# Patient Record
Sex: FEMALE | Race: WHITE | NOT HISPANIC OR LATINO | URBAN - METROPOLITAN AREA
[De-identification: names, ages, dates, MRNs, and addresses within clinical notes are randomized per-mention and may not be internally consistent; named-entity substitution may affect disease eponyms.]

---

## 2019-04-12 ENCOUNTER — EMERGENCY (EMERGENCY)
Facility: HOSPITAL | Age: 84
LOS: 1 days | Discharge: HOME | End: 2019-04-12
Attending: EMERGENCY MEDICINE
Payer: MEDICARE

## 2019-04-12 VITALS
WEIGHT: 145.06 LBS | HEIGHT: 64 IN | RESPIRATION RATE: 20 BRPM | OXYGEN SATURATION: 95 % | TEMPERATURE: 98 F | SYSTOLIC BLOOD PRESSURE: 141 MMHG | HEART RATE: 67 BPM | DIASTOLIC BLOOD PRESSURE: 66 MMHG

## 2019-04-12 VITALS
SYSTOLIC BLOOD PRESSURE: 140 MMHG | TEMPERATURE: 98 F | DIASTOLIC BLOOD PRESSURE: 72 MMHG | OXYGEN SATURATION: 95 % | HEART RATE: 70 BPM | RESPIRATION RATE: 20 BRPM

## 2019-04-12 LAB
ALBUMIN SERPL ELPH-MCNC: 4.2 G/DL — SIGNIFICANT CHANGE UP (ref 3.3–5.2)
ALP SERPL-CCNC: 102 U/L — SIGNIFICANT CHANGE UP (ref 40–120)
ALT FLD-CCNC: 21 U/L — SIGNIFICANT CHANGE UP
ANION GAP SERPL CALC-SCNC: 13 MMOL/L — SIGNIFICANT CHANGE UP (ref 5–17)
AST SERPL-CCNC: 19 U/L — SIGNIFICANT CHANGE UP
BASOPHILS # BLD AUTO: 0 K/UL — SIGNIFICANT CHANGE UP (ref 0–0.2)
BASOPHILS NFR BLD AUTO: 0.2 % — SIGNIFICANT CHANGE UP (ref 0–2)
BILIRUB SERPL-MCNC: 0.3 MG/DL — LOW (ref 0.4–2)
BUN SERPL-MCNC: 18 MG/DL — SIGNIFICANT CHANGE UP (ref 8–20)
CALCIUM SERPL-MCNC: 9.7 MG/DL — SIGNIFICANT CHANGE UP (ref 8.6–10.2)
CHLORIDE SERPL-SCNC: 103 MMOL/L — SIGNIFICANT CHANGE UP (ref 98–107)
CO2 SERPL-SCNC: 27 MMOL/L — SIGNIFICANT CHANGE UP (ref 22–29)
CREAT SERPL-MCNC: 0.99 MG/DL — SIGNIFICANT CHANGE UP (ref 0.5–1.3)
EOSINOPHIL # BLD AUTO: 0.2 K/UL — SIGNIFICANT CHANGE UP (ref 0–0.5)
EOSINOPHIL NFR BLD AUTO: 2.5 % — SIGNIFICANT CHANGE UP (ref 0–6)
GLUCOSE SERPL-MCNC: 145 MG/DL — HIGH (ref 70–115)
HCT VFR BLD CALC: 39.4 % — SIGNIFICANT CHANGE UP (ref 37–47)
HGB BLD-MCNC: 12.8 G/DL — SIGNIFICANT CHANGE UP (ref 12–16)
LYMPHOCYTES # BLD AUTO: 1.6 K/UL — SIGNIFICANT CHANGE UP (ref 1–4.8)
LYMPHOCYTES # BLD AUTO: 18.5 % — LOW (ref 20–55)
MCHC RBC-ENTMCNC: 26.9 PG — LOW (ref 27–31)
MCHC RBC-ENTMCNC: 32.5 G/DL — SIGNIFICANT CHANGE UP (ref 32–36)
MCV RBC AUTO: 82.8 FL — SIGNIFICANT CHANGE UP (ref 81–99)
MONOCYTES # BLD AUTO: 1 K/UL — HIGH (ref 0–0.8)
MONOCYTES NFR BLD AUTO: 10.8 % — HIGH (ref 3–10)
NEUTROPHILS # BLD AUTO: 5.9 K/UL — SIGNIFICANT CHANGE UP (ref 1.8–8)
NEUTROPHILS NFR BLD AUTO: 67.7 % — SIGNIFICANT CHANGE UP (ref 37–73)
PLATELET # BLD AUTO: 204 K/UL — SIGNIFICANT CHANGE UP (ref 150–400)
POTASSIUM SERPL-MCNC: 4.5 MMOL/L — SIGNIFICANT CHANGE UP (ref 3.5–5.3)
POTASSIUM SERPL-SCNC: 4.5 MMOL/L — SIGNIFICANT CHANGE UP (ref 3.5–5.3)
PROT SERPL-MCNC: 6.8 G/DL — SIGNIFICANT CHANGE UP (ref 6.6–8.7)
RBC # BLD: 4.76 M/UL — SIGNIFICANT CHANGE UP (ref 4.4–5.2)
RBC # FLD: 14.3 % — SIGNIFICANT CHANGE UP (ref 11–15.6)
SODIUM SERPL-SCNC: 143 MMOL/L — SIGNIFICANT CHANGE UP (ref 135–145)
TROPONIN T SERPL-MCNC: <0.01 NG/ML — SIGNIFICANT CHANGE UP (ref 0–0.06)
WBC # BLD: 8.8 K/UL — SIGNIFICANT CHANGE UP (ref 4.8–10.8)
WBC # FLD AUTO: 8.8 K/UL — SIGNIFICANT CHANGE UP (ref 4.8–10.8)

## 2019-04-12 PROCEDURE — 71045 X-RAY EXAM CHEST 1 VIEW: CPT | Mod: 26

## 2019-04-12 PROCEDURE — 73552 X-RAY EXAM OF FEMUR 2/>: CPT | Mod: 26,LT

## 2019-04-12 PROCEDURE — 99284 EMERGENCY DEPT VISIT MOD MDM: CPT | Mod: 25

## 2019-04-12 PROCEDURE — 70450 CT HEAD/BRAIN W/O DYE: CPT | Mod: 26

## 2019-04-12 PROCEDURE — 84484 ASSAY OF TROPONIN QUANT: CPT

## 2019-04-12 PROCEDURE — 73562 X-RAY EXAM OF KNEE 3: CPT

## 2019-04-12 PROCEDURE — 99285 EMERGENCY DEPT VISIT HI MDM: CPT

## 2019-04-12 PROCEDURE — 36415 COLL VENOUS BLD VENIPUNCTURE: CPT

## 2019-04-12 PROCEDURE — 80053 COMPREHEN METABOLIC PANEL: CPT

## 2019-04-12 PROCEDURE — 73552 X-RAY EXAM OF FEMUR 2/>: CPT

## 2019-04-12 PROCEDURE — 93005 ELECTROCARDIOGRAM TRACING: CPT

## 2019-04-12 PROCEDURE — 73562 X-RAY EXAM OF KNEE 3: CPT | Mod: 26,50

## 2019-04-12 PROCEDURE — 73502 X-RAY EXAM HIP UNI 2-3 VIEWS: CPT | Mod: 26,LT

## 2019-04-12 PROCEDURE — 73502 X-RAY EXAM HIP UNI 2-3 VIEWS: CPT

## 2019-04-12 PROCEDURE — 85027 COMPLETE CBC AUTOMATED: CPT

## 2019-04-12 PROCEDURE — 93010 ELECTROCARDIOGRAM REPORT: CPT

## 2019-04-12 PROCEDURE — 70450 CT HEAD/BRAIN W/O DYE: CPT

## 2019-04-12 PROCEDURE — 71045 X-RAY EXAM CHEST 1 VIEW: CPT

## 2019-04-12 NOTE — PHYSICAL THERAPY INITIAL EVALUATION ADULT - LEVEL OF INDEPENDENCE: SIT/STAND, REHAB EVAL
Admission medication history interview status for the 5/15/2018 admission is complete. See Epic admission navigator for allergy information, pharmacy, prior to admission medications and immunization status.     Medication history interview sources:   [facility]    Changes made to PTA medication list (reason)  Added: none  Deleted: Multivitamin  Changed:   Levetiracetam from daily to BID - note that there is a stop date of 5/21 on this medication on the MAR. Subsequent plan not noted.     Additional medication history information (including reliability of information, actions taken by pharmacist):   - Patient was refusing Prostat  - Fluconazole was scheduled 5/9-5/15 for fungal dermatitis, though patient only took it until 5/13  - Patient not taking modular protein supplement        Prior to Admission medications    Medication Sig Last Dose Taking? Auth Provider   diazepam (VALIUM) 2 MG tablet Take 2 tablets (4 mg) by mouth every 6 hours as needed for anxiety 5/15/2018 at 1500 Yes Shahriar Cadet MD   ipratropium - albuterol 0.5 mg/2.5 mg/3 mL (DUONEB) 0.5-2.5 (3) MG/3ML neb solution Take 1 vial (3 mLs) by nebulization every 4 hours as needed for wheezing Past Month at Unknown time Yes Shahriar Cadet MD   levETIRAcetam (KEPPRA) 250 MG tablet Take 1 tablet (250 mg) by mouth every evening Until 4/7/2018 then decrease to 250mg BID 4/8/2018-4/21/2018, then decrease to 250mg QAM 4/22-5/5/2018  Patient taking differently: Take 250 mg by mouth 2 times daily  5/15/2018 at 0800 Yes Shahriar Cadet MD   LEVOTHYROXINE SODIUM PO Take 125 mcg by mouth every morning 5/15/2018 at 0600 Yes Unknown, Entered By History   loperamide (IMODIUM) 2 MG capsule Take 2 capsules (4 mg) by mouth 4 times daily 5/15/2018 at 1600 Yes Shahriar Cadet MD   miconazole (MICATIN) 2 % AERP powder Apply topically 2 times daily Apply to groin folds 5/15/2018 at 0800 Yes Unknown, Entered By History   mineral  oil-hydrophilic petrolatum (AQUAPHOR) Apply topically daily Apply to lower extremities for skin irritation. 5/15/2018 at 0800 Yes Unknown, Entered By History   OMEPRAZOLE PO Take 20 mg by mouth daily (with breakfast) 5/15/2018 at 0800 Yes Unknown, Entered By History   ondansetron (ZOFRAN ODT) 4 MG ODT tab Take 1 tablet (4 mg) by mouth every 6 hours as needed for nausea 5/10/2018 at 2230 Yes Shahriar Cadet MD   oxyCODONE IR (ROXICODONE) 5 MG tablet Take 1-2 tablets (5-10 mg) by mouth every 4 hours as needed (Give 5mg for pain level 4-6 on a 10 point scale. Give 10mg for pain level 6-10 on a 10 point scale.) 5/15/2018 at 1800 Yes Shahriar Cadet MD   Rivaroxaban (XARELTO PO) Take 20 mg by mouth At Bedtime 5/14/2018 at 2000 Yes Unknown, Entered By History   triamcinolone (KENALOG) 0.1 % cream Apply topically 3 times daily 5/15/2018 at 0800 Yes Shahriar Cadet MD   protein modular (PROSOURCE TF) 1 packet by Per Feeding Tube route 3 times daily Unknown at Unknown time  Shahriar Cadet MD         Medication history completed by: Laura Rossi, PharmD  May 17, 2018         Modified Independent

## 2019-04-12 NOTE — PROVIDER CONTACT NOTE (OTHER) - ASSESSMENT
Pt with c/o 4/ 10 bilat knee pain, before, during and after Rx. Pt is functionally independent and not in need of PT.  Will no longer continue to follow. Pt left supine in bed in no apparent distress and call bell within reach, RN made aware.

## 2019-04-12 NOTE — ED PROVIDER NOTE - PROGRESS NOTE DETAILS
pt has no complaints at this time no fx on xray walked with walker with pt--will dc; unable to give ua but denies any symptoms

## 2019-04-12 NOTE — PHYSICAL THERAPY INITIAL EVALUATION ADULT - ADDITIONAL COMMENTS
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

## 2019-04-12 NOTE — CHART NOTE - NSCHARTNOTEFT_GEN_A_CORE
SHERICE Note - notified that pt already possesses RW - SHERICE called pt residence [facility] and spoke with RN Nanda who confirmed that pt does have RW in her room. pt being transported by BLS and will have her RW upon arrival. SHERICE spoke with pt who acknowledged that she "thought I might have one and don't need that one" RW was placed back in the CM/SW office - security Ovi was able to access and SW placed inside. pt comfortable and eager to return home.

## 2019-04-12 NOTE — CHART NOTE - NSCHARTNOTEFT_GEN_A_CORE
SOCIAL WORK NOTE:  RECEIVED NOTIFICATION FROM PHYSICAL THERAPY THAT PATIENT WOULD NEED A WALKER TO RETURN HOME.  PATIENT REPRTS THAT SHE IS GOING TO [facility] BUT WHEN THIS WORKER REVIEWED THE CLINICALS, THE PATIENT WAS FROM [facility] IN [place].  THIS WORKER PLACED CALL TO [facility] -[phone] AND CONFIRMED ABOVE WITH THEM.  THEY ARE ACCEPTING THE PATIENT BACK TODAY WITH HER NEW ROLLING WALKER AND REQUESTED CLINCALS BE FAXED TO THEM AT # [phone].  AMBULANCE FORM LOADED INTO [facility] FOR WHEN PATIENT IS READY FOR DISCHARGE.  AS PER DR STEWART, THEY ARE WAITING ON UA RESULTS AT THIS TIME.

## 2019-04-12 NOTE — ED PROVIDER NOTE - CLINICAL SUMMARY MEDICAL DECISION MAKING FREE TEXT BOX
mechanical fall will fu xrays labs ua to eval for uti; ct head neg; --pt to see pt will need walker --reassess

## 2019-04-12 NOTE — ED ADULT TRIAGE NOTE - CHIEF COMPLAINT QUOTE
fall getting out of bed of bed, landed onto knees, VILCHIS, no obvious trauma or deformity noted. negative LOC or head trauma.

## 2019-04-12 NOTE — ED PROVIDER NOTE - OBJECTIVE STATEMENT
88yo F hx of dm, pacemaker, dementia pw bl knee pain sp fall earlier this morning. notes that got up to go to the bathroom this morning fell onto her knees. no cp/sob/palpitations before falling.  Denies f/c/n/v/cp/sob/palpitations/cough/rash/headache/lightheadedness/abd.pain/d/c/dysuria/hematuria. no sick contacts no recent travel. no head trauma no loc

## 2019-04-12 NOTE — ED ADULT NURSE NOTE - OBJECTIVE STATEMENT
87 year old female in no acute distress, alert and oriented to self and place c/o fall, denies pain, denies injury, noted to have excoriated healing lesions to shin and forearm, fall precautions in place, will monitor.

## 2020-05-14 NOTE — ED ADULT NURSE NOTE - TOBACCO SCREENING (FROM THE ASSIST)
Additional Notes: Patient consent was obtained to proceed with the visit and recommended plan of care after discussion of all risks and benefits, including the risks of COVID-19 exposure. Detail Level: Simple Statement Selected

## 2024-12-23 NOTE — ED PROVIDER NOTE - CARDIOVASCULAR NEGATIVE STATEMENT, MLM
Addended by: JOHN CARDENAS on: 12/23/2024 11:32 AM     Modules accepted: Level of Service     no chest pain and no edema.